# Patient Record
Sex: MALE | Race: WHITE | Employment: OTHER | URBAN - METROPOLITAN AREA
[De-identification: names, ages, dates, MRNs, and addresses within clinical notes are randomized per-mention and may not be internally consistent; named-entity substitution may affect disease eponyms.]

---

## 2024-07-04 ENCOUNTER — HOSPITAL ENCOUNTER (EMERGENCY)
Age: 64
Discharge: HOME | End: 2024-07-04
Attending: EMERGENCY MEDICINE
Payer: COMMERCIAL

## 2024-07-04 VITALS
HEART RATE: 62 BPM | SYSTOLIC BLOOD PRESSURE: 145 MMHG | WEIGHT: 172 LBS | BODY MASS INDEX: 21.39 KG/M2 | TEMPERATURE: 98.1 F | RESPIRATION RATE: 18 BRPM | HEIGHT: 75 IN | DIASTOLIC BLOOD PRESSURE: 86 MMHG | OXYGEN SATURATION: 98 %

## 2024-07-04 DIAGNOSIS — S61.211A LACERATION OF LEFT INDEX FINGER WITHOUT FOREIGN BODY WITHOUT DAMAGE TO NAIL, INITIAL ENCOUNTER: Primary | ICD-10-CM

## 2024-07-04 PROCEDURE — 12001 RPR S/N/AX/GEN/TRNK 2.5CM/<: CPT

## 2024-07-04 PROCEDURE — 6370000000 HC RX 637 (ALT 250 FOR IP): Performed by: EMERGENCY MEDICINE

## 2024-07-04 PROCEDURE — 2500000003 HC RX 250 WO HCPCS

## 2024-07-04 PROCEDURE — 99282 EMERGENCY DEPT VISIT SF MDM: CPT

## 2024-07-04 RX ORDER — LIDOCAINE HYDROCHLORIDE 10 MG/ML
INJECTION, SOLUTION EPIDURAL; INFILTRATION; INTRACAUDAL; PERINEURAL
Status: COMPLETED
Start: 2024-07-04 | End: 2024-07-04

## 2024-07-04 RX ORDER — IBUPROFEN 200 MG
TABLET ORAL ONCE
Status: COMPLETED | OUTPATIENT
Start: 2024-07-04 | End: 2024-07-04

## 2024-07-04 RX ORDER — LIDOCAINE HYDROCHLORIDE 10 MG/ML
5 INJECTION, SOLUTION INFILTRATION; PERINEURAL ONCE
Status: COMPLETED | OUTPATIENT
Start: 2024-07-04 | End: 2024-07-04

## 2024-07-04 RX ADMIN — NEOMYCIN AND POLYMYXIN B SULFATES AND BACITRACIN ZINC: 400; 3.5; 5 OINTMENT TOPICAL at 08:59

## 2024-07-04 RX ADMIN — LIDOCAINE HYDROCHLORIDE 5 ML: 10 INJECTION, SOLUTION INFILTRATION; PERINEURAL at 08:59

## 2024-07-04 RX ADMIN — LIDOCAINE HYDROCHLORIDE 5 ML: 10 INJECTION, SOLUTION EPIDURAL; INFILTRATION; INTRACAUDAL; PERINEURAL at 08:59

## 2024-07-04 ASSESSMENT — PAIN - FUNCTIONAL ASSESSMENT: PAIN_FUNCTIONAL_ASSESSMENT: NONE - DENIES PAIN

## 2024-07-04 NOTE — DISCHARGE INSTRUCTIONS
Sutures should be removed in 10 to 14 days.  Keep bulky dressing on for 24 hours.  Please apply a thin coat of bacitracin or Neosporin every 12 hours.  It should not be submerged in water while stitches are in place.  You may shower and just pat it dry.

## 2024-07-04 NOTE — ED PROVIDER NOTES
Normal rate and regular rhythm.   Pulmonary:      Effort: Pulmonary effort is normal.      Breath sounds: Normal breath sounds.   Musculoskeletal:         General: No tenderness. Normal range of motion.      Cervical back: Normal range of motion and neck supple.   Skin:     General: Skin is warm and dry.      Findings: No rash.      Comments: There is a 2 cm V-shaped flap laceration on the lateral aspect of this patient's left index finger at the DIP.  It is minimally bleeding.   Neurological:      Mental Status: He is alert and oriented to person, place, and time.   Psychiatric:         Behavior: Behavior normal.         Thought Content: Thought content normal.         Judgment: Judgment normal.           MDM:   I did a digital block with 1% lidocaine.  After adequate anesthesia was attained was irrigated with normal saline and was prepped and draped in sterile fashion.  I explored.  No foreign bodies.  I have sutured it with five 5-0 monofilament nonabsorbable sutures in a simple interrupted fashion.  Good apposition was maintained and patient tolerated procedure well.  Bacitracin to close dressing has been applied.  Wound care instructions have been given.  He has been told with this being a flap laceration that is once sutures are removed, the devitalized skin will jesse slough off and die.    The patient was given the following medications:  Orders Placed This Encounter   Medications    lidocaine 1 % injection 5 mL    neomycin-bacitracin-polymyxin (NEOSPORIN) ointment    lidocaine PF 1 % injection     Lissette Hernandez: cabinet override          FINAL IMPRESSION      1. Laceration of left index finger without foreign body without damage to nail, initial encounter          DISPOSITION/PLAN   DISPOSITION Decision To Discharge 07/04/2024 08:49:31 AM      Condition on Disposition  Good    PATIENT REFERRED TO:  Jenna Watkins, APRN - CNP  [address], [id number]  [facility] [id number] [phone]    Schedule an  appointment as soon as possible for a visit in 2 days        DISCHARGE MEDICATIONS:  New Prescriptions    No medications on file       (Please note that portions of this note werecompleted with a voice recognition program.  Efforts were made to edit the dictations but occasionally words are mis-transcribed.)    Mynor Borrego MD MD, F.A.C.E.P, F.A.A.E.M  Emergency Physician Attending          Mynor Borrego MD  07/04/24 [id number]